# Patient Record
Sex: FEMALE | Race: OTHER | HISPANIC OR LATINO | Employment: UNEMPLOYED | URBAN - METROPOLITAN AREA
[De-identification: names, ages, dates, MRNs, and addresses within clinical notes are randomized per-mention and may not be internally consistent; named-entity substitution may affect disease eponyms.]

---
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
## 2022-01-07 ENCOUNTER — OFFICE VISIT (OUTPATIENT)
Dept: URGENT CARE | Facility: PHYSICIAN GROUP | Age: 22
End: 2022-01-07
Payer: OTHER GOVERNMENT

## 2022-01-07 VITALS
RESPIRATION RATE: 20 BRPM | WEIGHT: 120 LBS | BODY MASS INDEX: 24.19 KG/M2 | SYSTOLIC BLOOD PRESSURE: 92 MMHG | OXYGEN SATURATION: 94 % | HEART RATE: 107 BPM | TEMPERATURE: 97.8 F | DIASTOLIC BLOOD PRESSURE: 58 MMHG | HEIGHT: 59 IN

## 2022-01-07 DIAGNOSIS — J02.9 SORE THROAT: ICD-10-CM

## 2022-01-07 DIAGNOSIS — J98.8 RESPIRATORY TRACT INFECTION DUE TO COVID-19 VIRUS: ICD-10-CM

## 2022-01-07 DIAGNOSIS — U07.1 RESPIRATORY TRACT INFECTION DUE TO COVID-19 VIRUS: ICD-10-CM

## 2022-01-07 LAB
EXTERNAL QUALITY CONTROL: NORMAL
INT CON NEG: NEGATIVE
INT CON POS: POSITIVE
S PYO AG THROAT QL: NEGATIVE
SARS-COV+SARS-COV-2 AG RESP QL IA.RAPID: POSITIVE

## 2022-01-07 PROCEDURE — 99203 OFFICE O/P NEW LOW 30 MIN: CPT | Mod: CS | Performed by: EMERGENCY MEDICINE

## 2022-01-07 PROCEDURE — 87426 SARSCOV CORONAVIRUS AG IA: CPT | Performed by: EMERGENCY MEDICINE

## 2022-01-07 PROCEDURE — 87880 STREP A ASSAY W/OPTIC: CPT | Performed by: EMERGENCY MEDICINE

## 2022-01-07 ASSESSMENT — ENCOUNTER SYMPTOMS
SWOLLEN GLANDS: 1
MYALGIAS: 1
SORE THROAT: 1
DIARRHEA: 1
VOMITING: 1
RHINORRHEA: 1
SHORTNESS OF BREATH: 1
COUGH: 1
NAUSEA: 1
HEADACHES: 1
CHILLS: 1
SPUTUM PRODUCTION: 1

## 2022-01-07 NOTE — PROGRESS NOTES
"Subjective     Audrey Fagan is a 21 y.o. female who presents with Ear Pain (bilateral; 3x day), Cough (4x days), Headache, and Body Aches            URI   This is a new problem. Episode onset: 4 days. Associated symptoms include chest pain, congestion, coughing, diarrhea, ear pain, headaches, joint pain, nausea, rhinorrhea, a sore throat, swollen glands and vomiting.   Unvaccinated for Co-19, no significant PMH.    Review of Systems   Constitutional: Positive for chills and malaise/fatigue.   HENT: Positive for congestion, ear pain, nosebleeds, rhinorrhea and sore throat. Negative for hearing loss.    Respiratory: Positive for cough, sputum production and shortness of breath.    Cardiovascular: Positive for chest pain.   Gastrointestinal: Positive for diarrhea, nausea and vomiting.   Musculoskeletal: Positive for joint pain and myalgias.   Neurological: Positive for headaches.              Objective     BP (!) 92/58 (BP Location: Right arm, Patient Position: Sitting, BP Cuff Size: Adult)   Pulse (!) 107   Temp 36.6 °C (97.8 °F) (Temporal)   Resp 20   Ht 1.499 m (4' 11\")   Wt 54.4 kg (120 lb)   SpO2 94%   BMI 24.24 kg/m²      Physical Exam  Constitutional:       General: She is not in acute distress.     Appearance: She is well-developed. She is not toxic-appearing.   HENT:      Right Ear: Tympanic membrane and ear canal normal.      Left Ear: Tympanic membrane and ear canal normal.      Nose: Mucosal edema and rhinorrhea present.      Mouth/Throat:      Pharynx: Posterior oropharyngeal erythema present. No oropharyngeal exudate.   Eyes:      Conjunctiva/sclera: Conjunctivae normal.   Neck:      Trachea: Trachea normal.   Cardiovascular:      Rate and Rhythm: Regular rhythm. Tachycardia present.  No extrasystoles are present.     Heart sounds: Normal heart sounds. No murmur heard.      Pulmonary:      Effort: Pulmonary effort is normal.      Breath sounds: Normal breath sounds.   Musculoskeletal: "      Cervical back: Neck supple.   Lymphadenopathy:      Cervical: No cervical adenopathy.   Skin:     General: Skin is warm and dry.   Neurological:      Mental Status: She is alert.   Psychiatric:         Behavior: Behavior is cooperative.                         1 minute walk test SaO2 remains greater than 93%.    Assessment & Plan        1. Respiratory tract infection due to COVID-19 virus  Recommended supportive care measures, including rest, increasing oral fluid intake and use of over-the-counter medications for relief of symptoms.  10-day home isolation.  ED if any worsening breathing.  2. Sore throat  negative- POCT Rapid Strep A  positive- POCT SARS-COV Antigen RACHEL (Symptomatic Only)

## 2022-04-16 ENCOUNTER — APPOINTMENT (OUTPATIENT)
Dept: RADIOLOGY | Facility: MEDICAL CENTER | Age: 22
End: 2022-04-16
Attending: EMERGENCY MEDICINE
Payer: COMMERCIAL

## 2022-04-16 ENCOUNTER — HOSPITAL ENCOUNTER (EMERGENCY)
Facility: MEDICAL CENTER | Age: 22
End: 2022-04-16
Attending: EMERGENCY MEDICINE
Payer: COMMERCIAL

## 2022-04-16 VITALS
TEMPERATURE: 97 F | HEART RATE: 90 BPM | OXYGEN SATURATION: 95 % | HEIGHT: 59 IN | RESPIRATION RATE: 14 BRPM | SYSTOLIC BLOOD PRESSURE: 103 MMHG | BODY MASS INDEX: 23.07 KG/M2 | WEIGHT: 114.42 LBS | DIASTOLIC BLOOD PRESSURE: 55 MMHG

## 2022-04-16 DIAGNOSIS — O20.0 THREATENED MISCARRIAGE: ICD-10-CM

## 2022-04-16 DIAGNOSIS — N93.9 VAGINAL BLEEDING: ICD-10-CM

## 2022-04-16 LAB
ALBUMIN SERPL BCP-MCNC: 4.5 G/DL (ref 3.2–4.9)
ALBUMIN/GLOB SERPL: 2 G/DL
ALP SERPL-CCNC: 54 U/L (ref 30–99)
ALT SERPL-CCNC: 11 U/L (ref 2–50)
ANION GAP SERPL CALC-SCNC: 10 MMOL/L (ref 7–16)
APPEARANCE UR: ABNORMAL
AST SERPL-CCNC: 20 U/L (ref 12–45)
B-HCG SERPL-ACNC: 1947 MIU/ML (ref 0–5)
BACTERIA #/AREA URNS HPF: NEGATIVE /HPF
BASOPHILS # BLD AUTO: 0.2 % (ref 0–1.8)
BASOPHILS # BLD: 0.02 K/UL (ref 0–0.12)
BILIRUB SERPL-MCNC: 0.4 MG/DL (ref 0.1–1.5)
BILIRUB UR QL STRIP.AUTO: NEGATIVE
BUN SERPL-MCNC: 7 MG/DL (ref 8–22)
CALCIUM SERPL-MCNC: 9.1 MG/DL (ref 8.5–10.5)
CHLORIDE SERPL-SCNC: 104 MMOL/L (ref 96–112)
CO2 SERPL-SCNC: 23 MMOL/L (ref 20–33)
COLOR UR: ABNORMAL
CREAT SERPL-MCNC: 0.41 MG/DL (ref 0.5–1.4)
EOSINOPHIL # BLD AUTO: 0 K/UL (ref 0–0.51)
EOSINOPHIL NFR BLD: 0 % (ref 0–6.9)
EPI CELLS #/AREA URNS HPF: ABNORMAL /HPF
ERYTHROCYTE [DISTWIDTH] IN BLOOD BY AUTOMATED COUNT: 39.5 FL (ref 35.9–50)
GFR SERPLBLD CREATININE-BSD FMLA CKD-EPI: 144 ML/MIN/1.73 M 2
GLOBULIN SER CALC-MCNC: 2.3 G/DL (ref 1.9–3.5)
GLUCOSE SERPL-MCNC: 139 MG/DL (ref 65–99)
GLUCOSE UR STRIP.AUTO-MCNC: NEGATIVE MG/DL
HCT VFR BLD AUTO: 37.4 % (ref 37–47)
HGB BLD-MCNC: 13.2 G/DL (ref 12–16)
HYALINE CASTS #/AREA URNS LPF: ABNORMAL /LPF
IMM GRANULOCYTES # BLD AUTO: 0.04 K/UL (ref 0–0.11)
IMM GRANULOCYTES NFR BLD AUTO: 0.4 % (ref 0–0.9)
KETONES UR STRIP.AUTO-MCNC: ABNORMAL MG/DL
LEUKOCYTE ESTERASE UR QL STRIP.AUTO: ABNORMAL
LIPASE SERPL-CCNC: 22 U/L (ref 11–82)
LYMPHOCYTES # BLD AUTO: 1.49 K/UL (ref 1–4.8)
LYMPHOCYTES NFR BLD: 15.1 % (ref 22–41)
MCH RBC QN AUTO: 29.9 PG (ref 27–33)
MCHC RBC AUTO-ENTMCNC: 35.3 G/DL (ref 33.6–35)
MCV RBC AUTO: 84.6 FL (ref 81.4–97.8)
MICRO URNS: ABNORMAL
MONOCYTES # BLD AUTO: 0.3 K/UL (ref 0–0.85)
MONOCYTES NFR BLD AUTO: 3 % (ref 0–13.4)
NEUTROPHILS # BLD AUTO: 8.05 K/UL (ref 2–7.15)
NEUTROPHILS NFR BLD: 81.3 % (ref 44–72)
NITRITE UR QL STRIP.AUTO: NEGATIVE
NRBC # BLD AUTO: 0 K/UL
NRBC BLD-RTO: 0 /100 WBC
NUMBER OF RH DOSES IND 8505RD: NORMAL
PH UR STRIP.AUTO: 6 [PH] (ref 5–8)
PLATELET # BLD AUTO: 237 K/UL (ref 164–446)
PMV BLD AUTO: 8.6 FL (ref 9–12.9)
POTASSIUM SERPL-SCNC: 3.8 MMOL/L (ref 3.6–5.5)
PROT SERPL-MCNC: 6.8 G/DL (ref 6–8.2)
PROT UR QL STRIP: 30 MG/DL
RBC # BLD AUTO: 4.42 M/UL (ref 4.2–5.4)
RBC # URNS HPF: >150 /HPF
RBC UR QL AUTO: ABNORMAL
RH BLD: NORMAL
SODIUM SERPL-SCNC: 137 MMOL/L (ref 135–145)
SP GR UR STRIP.AUTO: 1.02
UROBILINOGEN UR STRIP.AUTO-MCNC: 1 MG/DL
WBC # BLD AUTO: 9.9 K/UL (ref 4.8–10.8)
WBC #/AREA URNS HPF: ABNORMAL /HPF

## 2022-04-16 PROCEDURE — 84702 CHORIONIC GONADOTROPIN TEST: CPT

## 2022-04-16 PROCEDURE — 81001 URINALYSIS AUTO W/SCOPE: CPT

## 2022-04-16 PROCEDURE — 86901 BLOOD TYPING SEROLOGIC RH(D): CPT

## 2022-04-16 PROCEDURE — 76817 TRANSVAGINAL US OBSTETRIC: CPT

## 2022-04-16 PROCEDURE — 80053 COMPREHEN METABOLIC PANEL: CPT

## 2022-04-16 PROCEDURE — 99284 EMERGENCY DEPT VISIT MOD MDM: CPT

## 2022-04-16 PROCEDURE — 36415 COLL VENOUS BLD VENIPUNCTURE: CPT

## 2022-04-16 PROCEDURE — 83690 ASSAY OF LIPASE: CPT

## 2022-04-16 PROCEDURE — 85025 COMPLETE CBC W/AUTO DIFF WBC: CPT

## 2022-04-16 PROCEDURE — 99285 EMERGENCY DEPT VISIT HI MDM: CPT

## 2022-04-16 NOTE — ED NOTES
Pt. Ambulatory to yellow pod. Verifies triage note. NIBP and pulse ox in place. VSS. Call light in reach.

## 2022-04-16 NOTE — ED NOTES
Pt. Left ambulatory and in NAD. Verbalized understanding of repeat blood work to be completed, f/u and home monitoring. Denies further questions at time of discharge.

## 2022-04-16 NOTE — ED TRIAGE NOTES
"Chief Complaint   Patient presents with   • Abdominal Pain     Starting this morning   • Vaginal Bleeding     X1 week. Patient had a positive home pregnancy test last month and last night retested and was positive.        Patient to triage ambulatory with a steady gait, AAOx4, Appropriate precautions in place.     Explained wait time and triage process. Placed back in lobby. Told to notify ED tech or RN of any changes, verbalized understanding.    /71   Pulse (!) 108   Temp 36.7 °C (98 °F) (Temporal)   Resp 14   Ht 1.499 m (4' 11\")   Wt 51.9 kg (114 lb 6.7 oz)   LMP 02/21/2022 (Within Weeks)   SpO2 99%   BMI 23.11 kg/m²     "

## 2022-04-16 NOTE — ED NOTES
Pt ambulated to restroom and back to bed. UA sent. NIBP and pulse ox in place. Pt. Denies further needs at this time. Call light in reach.

## 2022-04-16 NOTE — DISCHARGE INSTRUCTIONS
You are likely having a miscarriage.  However you need a recheck in 2 days to confirm this.  If you develop worsening abdominal pain, worsening bleeding or any other concerns return to the ER.  I hope you feel better soon.  You will receive a call from the gynecologist to get your appointment.  You must do your lab before going to see the doctor on Monday.

## 2022-04-18 ENCOUNTER — HOSPITAL ENCOUNTER (OUTPATIENT)
Dept: LAB | Facility: MEDICAL CENTER | Age: 22
End: 2022-04-18
Attending: EMERGENCY MEDICINE
Payer: COMMERCIAL

## 2022-04-18 ENCOUNTER — TELEPHONE (OUTPATIENT)
Dept: OBGYN | Facility: CLINIC | Age: 22
End: 2022-04-18
Payer: COMMERCIAL

## 2022-04-18 DIAGNOSIS — O20.0 THREATENED MISCARRIAGE: ICD-10-CM

## 2022-04-18 LAB — B-HCG SERPL-ACNC: 298 MIU/ML (ref 0–5)

## 2022-04-18 PROCEDURE — 84702 CHORIONIC GONADOTROPIN TEST: CPT

## 2022-04-18 PROCEDURE — 36415 COLL VENOUS BLD VENIPUNCTURE: CPT

## 2022-04-18 NOTE — TELEPHONE ENCOUNTER
Called the pt per request from  to fit in to the schedule next week for a ER f/u. Pt scheduled for an appointment on 04/27/2022. Pt asks if she needs to stay out of work for the week, consulted with  who states that she can go back but would need to be on light duty, no heavy lifting over 10 lbs, and frequent breaks for resting. Pt informed of this, and states that she understands, pt states that she plans on going in for her 2nd HCG lab today, informed the pt that if anything changes because of her lab we will give her a call. Pt also given bleeding precautions, and informed that if her bleeding becomes heavy she needs to go back to the ER. Pt agreeable to plan, and has no further questions.

## 2022-04-19 DIAGNOSIS — N93.9 VAGINAL BLEEDING, ABNORMAL: ICD-10-CM

## 2022-04-20 ENCOUNTER — TELEPHONE (OUTPATIENT)
Dept: OBGYN | Facility: CLINIC | Age: 22
End: 2022-04-20
Payer: COMMERCIAL

## 2022-04-20 NOTE — TELEPHONE ENCOUNTER
Pt notified.  ----- Message from Debo Fox M.D. sent at 4/19/2022  2:12 PM PDT -----  Her HCG dropped to 298 which is pretty consistent with a miscarriage. ( it was 1947 on 4/16). Please call and let her know. If she could get an HCG drawn the day or 2 prior to her appt on 4/27, that would be helpful. That way plan of care would be easier to formulate at that appt. I will ask Dr Pelaez or Yelena to place it (HCG order) in computer.  As you pointed out, please also change her appointment type.  ----- Message -----  From: Bailee Moyer M.D.  Sent: 4/16/2022   3:36 PM PDT  To: Debo Lopez M.D.    Hi Ladies,     Could we please get this patient in on Mon or Tues?  She is having a quant hcg drawn on Mon.  I suspect it is a miscarriage.    Thanks,  Dr. Moyer

## 2022-04-26 ENCOUNTER — HOSPITAL ENCOUNTER (OUTPATIENT)
Dept: LAB | Facility: MEDICAL CENTER | Age: 22
End: 2022-04-26
Attending: OBSTETRICS & GYNECOLOGY
Payer: COMMERCIAL

## 2022-04-26 DIAGNOSIS — N93.9 VAGINAL BLEEDING, ABNORMAL: ICD-10-CM

## 2022-04-26 LAB — B-HCG SERPL-ACNC: 11.1 MIU/ML (ref 0–5)

## 2022-04-26 PROCEDURE — 84702 CHORIONIC GONADOTROPIN TEST: CPT

## 2022-04-26 PROCEDURE — 36415 COLL VENOUS BLD VENIPUNCTURE: CPT

## 2022-04-27 ENCOUNTER — GYNECOLOGY VISIT (OUTPATIENT)
Dept: OBGYN | Facility: CLINIC | Age: 22
End: 2022-04-27
Payer: COMMERCIAL

## 2022-04-27 VITALS — BODY MASS INDEX: 22.42 KG/M2 | WEIGHT: 111 LBS | DIASTOLIC BLOOD PRESSURE: 72 MMHG | SYSTOLIC BLOOD PRESSURE: 118 MMHG

## 2022-04-27 DIAGNOSIS — O03.9 MISCARRIAGE: ICD-10-CM

## 2022-04-27 PROCEDURE — 99213 OFFICE O/P EST LOW 20 MIN: CPT | Performed by: OBSTETRICS & GYNECOLOGY

## 2022-04-27 ASSESSMENT — FIBROSIS 4 INDEX: FIB4 SCORE: 0.51

## 2022-04-27 NOTE — NON-PROVIDER
Pt here for ER follow up- possible miscarriage 04/16  Pt states has been bleeding and now has been having discharge that is brown.   Good# [phone]  Pharmacy verified   Has done 2 HCG counts

## 2022-05-02 ENCOUNTER — TELEPHONE (OUTPATIENT)
Dept: OBGYN | Facility: CLINIC | Age: 22
End: 2022-05-02
Payer: COMMERCIAL

## 2022-05-02 NOTE — TELEPHONE ENCOUNTER
05/02/22 8:24 AM    LM for pt to call back in regards to her HCG levels falling appropriately after miscarriage.

## 2022-05-03 ENCOUNTER — HOSPITAL ENCOUNTER (OUTPATIENT)
Dept: LAB | Facility: MEDICAL CENTER | Age: 22
End: 2022-05-03
Attending: OBSTETRICS & GYNECOLOGY
Payer: COMMERCIAL

## 2022-05-03 DIAGNOSIS — O03.9 MISCARRIAGE: ICD-10-CM

## 2022-05-03 LAB — B-HCG SERPL-ACNC: 1.8 MIU/ML (ref 0–5)

## 2022-05-03 PROCEDURE — 84702 CHORIONIC GONADOTROPIN TEST: CPT

## 2022-05-03 PROCEDURE — 36415 COLL VENOUS BLD VENIPUNCTURE: CPT

## 2022-05-10 ENCOUNTER — TELEPHONE (OUTPATIENT)
Dept: OBGYN | Facility: CLINIC | Age: 22
End: 2022-05-10
Payer: COMMERCIAL

## 2022-05-10 NOTE — TELEPHONE ENCOUNTER
----- Message from Cesar Arboleda M.D. sent at 5/9/2022  3:26 PM PDT -----  Pregnancy hormone levels negative.  Miscarriage resolved      Called pt and notified as above. Pt verbalized understanding.   Pt informed she was scheduled on the wrong office, I apologized and informed pt I will talk to Dr. Arboleda to see if we can fit her in his schedule on Thursday when he is is at our [facility] location when pt needs to be seen. Pt agreed. Pt state she has papers that have to be sign by Dr. Arboleda before she can go back to work. Advised pt to bring them to her appt on Thursday if able to fit her in or drop them off today.      1430 cosulted with Dr. Arboleda and agreed for this RN to fir in pt for Thursday 5/12/2022 at 1030. Called pt and pt scheduled. Pt agreed.

## 2022-05-11 ENCOUNTER — APPOINTMENT (OUTPATIENT)
Dept: OBGYN | Facility: CLINIC | Age: 22
End: 2022-05-11
Payer: COMMERCIAL

## 2022-05-12 ENCOUNTER — GYNECOLOGY VISIT (OUTPATIENT)
Dept: OBGYN | Facility: CLINIC | Age: 22
End: 2022-05-12
Payer: COMMERCIAL

## 2022-05-12 VITALS — DIASTOLIC BLOOD PRESSURE: 60 MMHG | SYSTOLIC BLOOD PRESSURE: 92 MMHG | WEIGHT: 112 LBS | BODY MASS INDEX: 22.62 KG/M2

## 2022-05-12 DIAGNOSIS — O03.9 MISCARRIAGE: ICD-10-CM

## 2022-05-12 PROCEDURE — 99213 OFFICE O/P EST LOW 20 MIN: CPT | Performed by: OBSTETRICS & GYNECOLOGY

## 2022-05-12 RX ORDER — LEVONORGESTREL AND ETHINYL ESTRADIOL 0.1-0.02MG
1 KIT ORAL DAILY
Qty: 28 TABLET | Refills: 12 | Status: SHIPPED | OUTPATIENT
Start: 2022-05-12 | End: 2023-07-31

## 2022-05-12 ASSESSMENT — FIBROSIS 4 INDEX: FIB4 SCORE: 0.51

## 2022-05-12 NOTE — PROGRESS NOTES
Chief Complaint   Patient presents with   • Follow-Up     Lab Result review    Chief complaint: Follow-up miscarriage    History of present illness:   21 y.o.  presents with above chief complaint.  Patient was seen approximately 2 weeks ago.  hCG dropping.  Last level 1.8.  Resolution of miscarriage    No further bleeding since last seen.  Considering some contraception at this time    ROS: Pertinent positives documented in HPI and all other systems reviewed & are negative    POBHx:  1 para 0-0-1-0    PGYNHx: As above, last pap never    All PMH, PSH, meds, allergies, social history and FH reviewed and updated today:  Past Medical History:   Diagnosis Date   • Healthy pediatric patient        No past surgical history on file.    Allergies: No Known Allergies    Social History     Socioeconomic History   • Marital status: Single     Spouse name: Not on file   • Number of children: Not on file   • Years of education: Not on file   • Highest education level: Not on file   Occupational History   • Not on file   Tobacco Use   • Smoking status: Never Smoker   • Smokeless tobacco: Never Used   Vaping Use   • Vaping Use: Never used   Substance and Sexual Activity   • Alcohol use: Yes   • Drug use: No   • Sexual activity: Never   Other Topics Concern   • Behavioral problems Not Asked   • Interpersonal relationships Not Asked   • Sad or not enjoying activities Not Asked   • Suicidal thoughts Not Asked   • Poor school performance Not Asked   • Reading difficulties Not Asked   • Speech difficulties No   • Writing difficulties Not Asked   • Inadequate sleep Not Asked   • Excessive TV viewing Not Asked   • Excessive video game use Not Asked   • Inadequate exercise Not Asked   • Sports related Not Asked   • Poor diet Not Asked   • Family concerns for drug/alcohol abuse Not Asked   • Poor oral hygiene Not Asked   • Bike safety Not Asked   • Family concerns vehicle safety Not Asked   Social History Narrative   • Not on  file     Social Determinants of Health     Financial Resource Strain: Not on file   Food Insecurity: Not on file   Transportation Needs: Not on file   Physical Activity: Not on file   Stress: Not on file   Social Connections: Not on file   Intimate Partner Violence: Not on file   Housing Stability: Not on file       Family History   Problem Relation Age of Onset   • No Known Problems Mother    • No Known Problems Father    • No Known Problems Sister    • No Known Problems Brother        Physical exam:  BP (!) 92/60 (BP Location: Left arm, Patient Position: Sitting, BP Cuff Size: Adult)   Wt 50.8 kg (112 lb)     GENERAL APPEARANCE: healthy, alert, no distress  ABDOMEN Abdomen soft, non-tender. BS normal. No masses,  No organomegaly  EXTREMITIES:negative clubbing, cyanosis, edema    NEURO Awake, alert and oriented x 3, Normal gait, no sensory deficits  SKIN No rashes, or ulcers or lesions seen  PSYCHIATRIC: Patient shows appropriate affect, is alert and oriented x3, intact judgment and insight.      Assessment:  No diagnosis found.    Plan:    hCG levels now negative.  Miscarriage has resolved spontaneously.  No issues identified    Patient interested in contraception.  Discussed birth control pills, ring, patch, Depo-Provera, Nexplanon, IUD with patient.  Pros and cons of the methods were discussed with the patient    Patient is opted to undergo treatment with birth-control pills.  Denies history of DVT/pulmonary embolism, liver disease, migraines with aura or hypertension    Prescription sent.  Follow-up in 2 months

## 2022-05-23 ENCOUNTER — TELEPHONE (OUTPATIENT)
Dept: OBGYN | Facility: CLINIC | Age: 22
End: 2022-05-23
Payer: COMMERCIAL

## 2022-05-23 NOTE — TELEPHONE ENCOUNTER
"Spoke with pt and informed her that I was unable to fill out her disability form due to there not being a medical indication to do so. I informed pt that I would be able to  fill out the \"return to work\" form and have her return to work date be one week after she was seen.Per Gloria HYLTON that is the time given for MC without procedure. Pt stated that she had tried to give paperwork to return to work at her last two visits, but the people she spoke with in regards to the form didn't seem like they wanted to complete the paperwork, so she had been off this whole time due to not getting paperwork completed. I informed the pt that the form was now completed (dropped off to me on 5/19/22) and that she could pick it up at her earliest convenience, pt requested I also fax the form. I agreed, fax confirmation scanned into media. At the end of the conversation pt stated that she no longer needed the disability forms to filled out and that the \"Return to work form\" would be \"good enough\", and that was all she need. Pt had no further questions or concerns.   "

## 2022-07-18 ENCOUNTER — APPOINTMENT (OUTPATIENT)
Dept: RADIOLOGY | Facility: MEDICAL CENTER | Age: 22
End: 2022-07-18
Attending: STUDENT IN AN ORGANIZED HEALTH CARE EDUCATION/TRAINING PROGRAM
Payer: COMMERCIAL

## 2022-07-18 ENCOUNTER — HOSPITAL ENCOUNTER (EMERGENCY)
Facility: MEDICAL CENTER | Age: 22
End: 2022-07-19
Attending: STUDENT IN AN ORGANIZED HEALTH CARE EDUCATION/TRAINING PROGRAM
Payer: COMMERCIAL

## 2022-07-18 ENCOUNTER — OFFICE VISIT (OUTPATIENT)
Dept: URGENT CARE | Facility: CLINIC | Age: 22
End: 2022-07-18
Payer: COMMERCIAL

## 2022-07-18 ENCOUNTER — APPOINTMENT (OUTPATIENT)
Dept: URGENT CARE | Facility: CLINIC | Age: 22
End: 2022-07-18
Payer: COMMERCIAL

## 2022-07-18 VITALS
WEIGHT: 104 LBS | TEMPERATURE: 99.1 F | HEART RATE: 134 BPM | SYSTOLIC BLOOD PRESSURE: 110 MMHG | HEIGHT: 59 IN | BODY MASS INDEX: 20.96 KG/M2 | OXYGEN SATURATION: 91 % | DIASTOLIC BLOOD PRESSURE: 70 MMHG | RESPIRATION RATE: 14 BRPM

## 2022-07-18 DIAGNOSIS — R11.2 NAUSEA AND VOMITING IN ADULT: ICD-10-CM

## 2022-07-18 DIAGNOSIS — R50.9 FEVER IN ADULT: ICD-10-CM

## 2022-07-18 DIAGNOSIS — R39.11 URINARY HESITANCY: ICD-10-CM

## 2022-07-18 DIAGNOSIS — R10.9 FLANK PAIN: ICD-10-CM

## 2022-07-18 DIAGNOSIS — N12 PYELONEPHRITIS: ICD-10-CM

## 2022-07-18 DIAGNOSIS — R00.0 TACHYCARDIA: ICD-10-CM

## 2022-07-18 DIAGNOSIS — E86.0 DEHYDRATION: ICD-10-CM

## 2022-07-18 DIAGNOSIS — Z20.822 CLOSE EXPOSURE TO COVID-19 VIRUS: ICD-10-CM

## 2022-07-18 LAB
ALBUMIN SERPL BCP-MCNC: 4 G/DL (ref 3.2–4.9)
ALBUMIN/GLOB SERPL: 1.2 G/DL
ALP SERPL-CCNC: 75 U/L (ref 30–99)
ALT SERPL-CCNC: 56 U/L (ref 2–50)
ANION GAP SERPL CALC-SCNC: 13 MMOL/L (ref 7–16)
APPEARANCE UR: ABNORMAL
AST SERPL-CCNC: 62 U/L (ref 12–45)
BASOPHILS # BLD AUTO: 0.5 % (ref 0–1.8)
BASOPHILS # BLD: 0.06 K/UL (ref 0–0.12)
BILIRUB SERPL-MCNC: 0.4 MG/DL (ref 0.1–1.5)
BILIRUB UR STRIP-MCNC: NEGATIVE MG/DL
BUN SERPL-MCNC: 9 MG/DL (ref 8–22)
CALCIUM SERPL-MCNC: 8.6 MG/DL (ref 8.5–10.5)
CHLORIDE SERPL-SCNC: 93 MMOL/L (ref 96–112)
CO2 SERPL-SCNC: 24 MMOL/L (ref 20–33)
COLOR UR AUTO: ABNORMAL
CREAT SERPL-MCNC: 0.83 MG/DL (ref 0.5–1.4)
D DIMER PPP IA.FEU-MCNC: 4.66 UG/ML (FEU) (ref 0–0.5)
EOSINOPHIL # BLD AUTO: 0.13 K/UL (ref 0–0.51)
EOSINOPHIL NFR BLD: 1.1 % (ref 0–6.9)
ERYTHROCYTE [DISTWIDTH] IN BLOOD BY AUTOMATED COUNT: 39.7 FL (ref 35.9–50)
FLUAV RNA SPEC QL NAA+PROBE: NEGATIVE
FLUBV RNA SPEC QL NAA+PROBE: NEGATIVE
GFR SERPLBLD CREATININE-BSD FMLA CKD-EPI: 103 ML/MIN/1.73 M 2
GLOBULIN SER CALC-MCNC: 3.3 G/DL (ref 1.9–3.5)
GLUCOSE SERPL-MCNC: 120 MG/DL (ref 65–99)
GLUCOSE UR STRIP.AUTO-MCNC: NEGATIVE MG/DL
HCG SERPL QL: NEGATIVE
HCT VFR BLD AUTO: 38.9 % (ref 37–47)
HGB BLD-MCNC: 13 G/DL (ref 12–16)
IMM GRANULOCYTES # BLD AUTO: 0.07 K/UL (ref 0–0.11)
IMM GRANULOCYTES NFR BLD AUTO: 0.6 % (ref 0–0.9)
KETONES UR STRIP.AUTO-MCNC: 80 MG/DL
LACTATE SERPL-SCNC: 1.1 MMOL/L (ref 0.5–2)
LEUKOCYTE ESTERASE UR QL STRIP.AUTO: ABNORMAL
LIPASE SERPL-CCNC: 20 U/L (ref 11–82)
LYMPHOCYTES # BLD AUTO: 1.41 K/UL (ref 1–4.8)
LYMPHOCYTES NFR BLD: 11.5 % (ref 22–41)
MCH RBC QN AUTO: 28.1 PG (ref 27–33)
MCHC RBC AUTO-ENTMCNC: 33.4 G/DL (ref 33.6–35)
MCV RBC AUTO: 84.2 FL (ref 81.4–97.8)
MONOCYTES # BLD AUTO: 1.54 K/UL (ref 0–0.85)
MONOCYTES NFR BLD AUTO: 12.6 % (ref 0–13.4)
NEUTROPHILS # BLD AUTO: 9 K/UL (ref 2–7.15)
NEUTROPHILS NFR BLD: 73.7 % (ref 44–72)
NITRITE UR QL STRIP.AUTO: POSITIVE
NRBC # BLD AUTO: 0 K/UL
NRBC BLD-RTO: 0 /100 WBC
PH UR STRIP.AUTO: 6 [PH] (ref 5–8)
PLATELET # BLD AUTO: 259 K/UL (ref 164–446)
PMV BLD AUTO: 8.8 FL (ref 9–12.9)
POTASSIUM SERPL-SCNC: 3.4 MMOL/L (ref 3.6–5.5)
PROT SERPL-MCNC: 7.3 G/DL (ref 6–8.2)
PROT UR QL STRIP: 100 MG/DL
RBC # BLD AUTO: 4.62 M/UL (ref 4.2–5.4)
RBC UR QL AUTO: ABNORMAL
RSV RNA SPEC QL NAA+PROBE: NEGATIVE
SARS-COV-2 RNA RESP QL NAA+PROBE: NOTDETECTED
SODIUM SERPL-SCNC: 130 MMOL/L (ref 135–145)
SP GR UR STRIP.AUTO: 1.01
SPECIMEN SOURCE: NORMAL
UROBILINOGEN UR STRIP-MCNC: 4 MG/DL
WBC # BLD AUTO: 12.2 K/UL (ref 4.8–10.8)

## 2022-07-18 PROCEDURE — 85379 FIBRIN DEGRADATION QUANT: CPT

## 2022-07-18 PROCEDURE — C9803 HOPD COVID-19 SPEC COLLECT: HCPCS

## 2022-07-18 PROCEDURE — 0241U HCHG SARS-COV-2 COVID-19 NFCT DS RESP RNA 4 TRGT MIC: CPT

## 2022-07-18 PROCEDURE — 85025 COMPLETE CBC W/AUTO DIFF WBC: CPT

## 2022-07-18 PROCEDURE — 83605 ASSAY OF LACTIC ACID: CPT

## 2022-07-18 PROCEDURE — 700102 HCHG RX REV CODE 250 W/ 637 OVERRIDE(OP): Performed by: STUDENT IN AN ORGANIZED HEALTH CARE EDUCATION/TRAINING PROGRAM

## 2022-07-18 PROCEDURE — 700105 HCHG RX REV CODE 258: Performed by: STUDENT IN AN ORGANIZED HEALTH CARE EDUCATION/TRAINING PROGRAM

## 2022-07-18 PROCEDURE — 74177 CT ABD & PELVIS W/CONTRAST: CPT

## 2022-07-18 PROCEDURE — 83690 ASSAY OF LIPASE: CPT

## 2022-07-18 PROCEDURE — 84703 CHORIONIC GONADOTROPIN ASSAY: CPT

## 2022-07-18 PROCEDURE — 87077 CULTURE AEROBIC IDENTIFY: CPT

## 2022-07-18 PROCEDURE — 87186 SC STD MICRODIL/AGAR DIL: CPT

## 2022-07-18 PROCEDURE — A9270 NON-COVERED ITEM OR SERVICE: HCPCS | Performed by: STUDENT IN AN ORGANIZED HEALTH CARE EDUCATION/TRAINING PROGRAM

## 2022-07-18 PROCEDURE — 87040 BLOOD CULTURE FOR BACTERIA: CPT

## 2022-07-18 PROCEDURE — C9803 HOPD COVID-19 SPEC COLLECT: HCPCS | Mod: EDC | Performed by: STUDENT IN AN ORGANIZED HEALTH CARE EDUCATION/TRAINING PROGRAM

## 2022-07-18 PROCEDURE — 99214 OFFICE O/P EST MOD 30 MIN: CPT | Performed by: NURSE PRACTITIONER

## 2022-07-18 PROCEDURE — 81002 URINALYSIS NONAUTO W/O SCOPE: CPT | Performed by: NURSE PRACTITIONER

## 2022-07-18 PROCEDURE — 87086 URINE CULTURE/COLONY COUNT: CPT

## 2022-07-18 PROCEDURE — 99285 EMERGENCY DEPT VISIT HI MDM: CPT | Mod: EDC

## 2022-07-18 PROCEDURE — 36415 COLL VENOUS BLD VENIPUNCTURE: CPT | Mod: EDC

## 2022-07-18 PROCEDURE — 71045 X-RAY EXAM CHEST 1 VIEW: CPT

## 2022-07-18 PROCEDURE — 80053 COMPREHEN METABOLIC PANEL: CPT

## 2022-07-18 PROCEDURE — 81001 URINALYSIS AUTO W/SCOPE: CPT

## 2022-07-18 PROCEDURE — 700117 HCHG RX CONTRAST REV CODE 255: Performed by: STUDENT IN AN ORGANIZED HEALTH CARE EDUCATION/TRAINING PROGRAM

## 2022-07-18 RX ORDER — ACETAMINOPHEN 325 MG/1
650 TABLET ORAL ONCE
Status: COMPLETED | OUTPATIENT
Start: 2022-07-18 | End: 2022-07-18

## 2022-07-18 RX ORDER — SODIUM CHLORIDE 9 MG/ML
1000 INJECTION, SOLUTION INTRAVENOUS ONCE
Status: COMPLETED | OUTPATIENT
Start: 2022-07-18 | End: 2022-07-19

## 2022-07-18 RX ORDER — SODIUM CHLORIDE 9 MG/ML
1000 INJECTION, SOLUTION INTRAVENOUS ONCE
Status: COMPLETED | OUTPATIENT
Start: 2022-07-18 | End: 2022-07-18

## 2022-07-18 RX ADMIN — SODIUM CHLORIDE 1000 ML: 9 INJECTION, SOLUTION INTRAVENOUS at 23:18

## 2022-07-18 RX ADMIN — IOHEXOL 75 ML: 350 INJECTION, SOLUTION INTRAVENOUS at 23:10

## 2022-07-18 RX ADMIN — ACETAMINOPHEN 650 MG: 325 TABLET, FILM COATED ORAL at 22:02

## 2022-07-18 RX ADMIN — SODIUM CHLORIDE 1000 ML: 9 INJECTION, SOLUTION INTRAVENOUS at 22:02

## 2022-07-18 ASSESSMENT — LIFESTYLE VARIABLES
EVER FELT BAD OR GUILTY ABOUT YOUR DRINKING: NO
CONSUMPTION TOTAL: INCOMPLETE
DO YOU DRINK ALCOHOL: NO
TOTAL SCORE: 0
TOTAL SCORE: 0
EVER HAD A DRINK FIRST THING IN THE MORNING TO STEADY YOUR NERVES TO GET RID OF A HANGOVER: NO
DOES PATIENT WANT TO STOP DRINKING: NO
TOTAL SCORE: 0
HAVE YOU EVER FELT YOU SHOULD CUT DOWN ON YOUR DRINKING: NO
HAVE PEOPLE ANNOYED YOU BY CRITICIZING YOUR DRINKING: NO

## 2022-07-18 ASSESSMENT — FIBROSIS 4 INDEX
FIB4 SCORE: 0.51
FIB4 SCORE: 0.51

## 2022-07-19 VITALS
OXYGEN SATURATION: 97 % | DIASTOLIC BLOOD PRESSURE: 58 MMHG | TEMPERATURE: 98.2 F | HEART RATE: 94 BPM | RESPIRATION RATE: 19 BRPM | SYSTOLIC BLOOD PRESSURE: 103 MMHG | BODY MASS INDEX: 21.02 KG/M2 | WEIGHT: 104.28 LBS | HEIGHT: 59 IN

## 2022-07-19 LAB
APPEARANCE UR: ABNORMAL
BACTERIA #/AREA URNS HPF: ABNORMAL /HPF
BILIRUB UR QL STRIP.AUTO: NEGATIVE
COLOR UR: YELLOW
EPI CELLS #/AREA URNS HPF: ABNORMAL /HPF
GLUCOSE UR STRIP.AUTO-MCNC: NEGATIVE MG/DL
KETONES UR STRIP.AUTO-MCNC: ABNORMAL MG/DL
LEUKOCYTE ESTERASE UR QL STRIP.AUTO: ABNORMAL
MICRO URNS: ABNORMAL
NITRITE UR QL STRIP.AUTO: POSITIVE
PH UR STRIP.AUTO: 6 [PH] (ref 5–8)
PROT UR QL STRIP: 30 MG/DL
RBC # URNS HPF: ABNORMAL /HPF
RBC UR QL AUTO: ABNORMAL
SP GR UR STRIP.AUTO: >=1.045
UROBILINOGEN UR STRIP.AUTO-MCNC: 1 MG/DL
WBC #/AREA URNS HPF: ABNORMAL /HPF

## 2022-07-19 PROCEDURE — 71275 CT ANGIOGRAPHY CHEST: CPT

## 2022-07-19 PROCEDURE — 96374 THER/PROPH/DIAG INJ IV PUSH: CPT | Mod: EDC,XU

## 2022-07-19 PROCEDURE — 700117 HCHG RX CONTRAST REV CODE 255: Performed by: STUDENT IN AN ORGANIZED HEALTH CARE EDUCATION/TRAINING PROGRAM

## 2022-07-19 PROCEDURE — 700111 HCHG RX REV CODE 636 W/ 250 OVERRIDE (IP): Performed by: STUDENT IN AN ORGANIZED HEALTH CARE EDUCATION/TRAINING PROGRAM

## 2022-07-19 RX ORDER — CEFTRIAXONE 2 G/1
2 INJECTION, POWDER, FOR SOLUTION INTRAMUSCULAR; INTRAVENOUS ONCE
Status: COMPLETED | OUTPATIENT
Start: 2022-07-19 | End: 2022-07-19

## 2022-07-19 RX ORDER — CEFDINIR 300 MG/1
300 CAPSULE ORAL 2 TIMES DAILY
Qty: 14 CAPSULE | Refills: 0 | Status: SHIPPED | OUTPATIENT
Start: 2022-07-19 | End: 2022-07-26

## 2022-07-19 RX ADMIN — CEFTRIAXONE SODIUM 2 G: 2 INJECTION, POWDER, FOR SOLUTION INTRAMUSCULAR; INTRAVENOUS at 00:37

## 2022-07-19 RX ADMIN — IOHEXOL 50 ML: 350 INJECTION, SOLUTION INTRAVENOUS at 00:05

## 2022-07-19 NOTE — ED NOTES
Patient taken to CT by CT tech staff.  Patient leaves the department awake, alert, in no apparent distress.

## 2022-07-19 NOTE — ED NOTES
Patient returned from CT with CT tech staff.  Patient arrives in the department awake, alert and in no apparent distress.  Additional fluids established and VS reassessed.  Patient denies the urge to void still.  No other needs or concerns at this time.

## 2022-07-19 NOTE — ED PROVIDER NOTES
"ED Provider Note    CHIEF COMPLAINT  Chief Complaint   Patient presents with   • Coronavirus Screening   • Sent from Urgent Care   • Flank Pain   • Abdominal Pain       HPI  Audrey Fagan is a 21 y.o. female who presents with complaints of several weeks of congestion, cough, headaches, neck stiffness, abdominal pain, \"uterus\" pain, myalgias.  Patient reports has had fevers but states the highest fever she has had at home is 98.  She reports generalized weakness.  She states her neck stiffness has improved and is now only present sometimes at night.  She had her immunizations as a child.  She states the main reason she came here today was because she is having trouble going back to work due to persistent symptoms.  She states she has been going to work and is able to ambulate without difficulty around at work but is just extremely fatigued and people keep sending her home because she appears ill.  She denies any dysuria.  Denies unusual vaginal bleeding or discharge.  She is here with her boyfriend.  She stopped taking her birth control pills about 1 week ago to see if this would help with the symptoms.  She states she has not been sexually active since that time.  She denies sore throat.  Patient also reports low back pain that has been ongoing since the symptoms started.  She states that has felt worse in the last week.  She denies trauma.  Denies history of IV drug use.  Denies bowel or bladder incontinence.     REVIEW OF SYSTEMS  See HPI for further details. All other systems are negative.     PAST MEDICAL HISTORY   has a past medical history of Healthy pediatric patient.    SOCIAL HISTORY  Social History     Tobacco Use   • Smoking status: Never Smoker   • Smokeless tobacco: Never Used   Vaping Use   • Vaping Use: Never used   Substance and Sexual Activity   • Alcohol use: Yes   • Drug use: No   • Sexual activity: Never       SURGICAL HISTORY  patient denies any surgical history    CURRENT " "MEDICATIONS  Home Medications     Reviewed by Claudine Samaniego R.N. (Registered Nurse) on 07/18/22 at 2105  Med List Status: Not Addressed   Medication Last Dose Status   levonorgestrel-ethinyl estradiol (AVIANE) 0.1-20 MG-MCG per tablet  Active                ALLERGIES  No Known Allergies    PHYSICAL EXAM  VITAL SIGNS: /58   Pulse 94   Temp 36.8 °C (98.2 °F) (Temporal)   Resp 19   Ht 1.499 m (4' 11\")   Wt 47.3 kg (104 lb 4.4 oz)   SpO2 97%   BMI 21.06 kg/m²     Pulse ox interpretation: I interpret this pulse ox as normal.  Constitutional: Alert in no apparent distress.  Pale  HENT: No signs of trauma, Bilateral external ears normal, Nose normal.  Oropharynx is clear, no unilateral swelling or exudates  Eyes: Pupils are equal and reactive, Conjunctiva normal, Non-icteric.   Neck: Normal range of motion, No tenderness, Supple, No stridor.  No meningismus  Lymphatic: No cervical lymphadenopathy noted.   Cardiovascular: Tachycardic, regular rhythm, no murmurs.   Thorax & Lungs: Normal breath sounds, No respiratory distress, No wheezing  Abdomen: Soft, No tenderness, No masses, No pulsatile masses. No peritoneal signs.  Skin: Warm, Dry, No erythema, No rash.   Back: No midline bony tenderness, No CVA tenderness.   Extremities: Intact distal pulses, No edema, No tenderness, No cyanosis.  Musculoskeletal: Good range of motion in all major joints. No major deformities noted.   Neurologic: Alert , Normal motor function, No focal deficits noted.   Psychiatric: Affect normal, Judgment normal, Mood normal.       DIAGNOSTIC STUDIES / PROCEDURES    LABS  Results for orders placed or performed during the hospital encounter of 07/18/22   CBC WITH DIFFERENTIAL   Result Value Ref Range    WBC 12.2 (H) 4.8 - 10.8 K/uL    RBC 4.62 4.20 - 5.40 M/uL    Hemoglobin 13.0 12.0 - 16.0 g/dL    Hematocrit 38.9 37.0 - 47.0 %    MCV 84.2 81.4 - 97.8 fL    MCH 28.1 27.0 - 33.0 pg    MCHC 33.4 (L) 33.6 - 35.0 g/dL    RDW 39.7 35.9 - " 50.0 fL    Platelet Count 259 164 - 446 K/uL    MPV 8.8 (L) 9.0 - 12.9 fL    Neutrophils-Polys 73.70 (H) 44.00 - 72.00 %    Lymphocytes 11.50 (L) 22.00 - 41.00 %    Monocytes 12.60 0.00 - 13.40 %    Eosinophils 1.10 0.00 - 6.90 %    Basophils 0.50 0.00 - 1.80 %    Immature Granulocytes 0.60 0.00 - 0.90 %    Nucleated RBC 0.00 /100 WBC    Neutrophils (Absolute) 9.00 (H) 2.00 - 7.15 K/uL    Lymphs (Absolute) 1.41 1.00 - 4.80 K/uL    Monos (Absolute) 1.54 (H) 0.00 - 0.85 K/uL    Eos (Absolute) 0.13 0.00 - 0.51 K/uL    Baso (Absolute) 0.06 0.00 - 0.12 K/uL    Immature Granulocytes (abs) 0.07 0.00 - 0.11 K/uL    NRBC (Absolute) 0.00 K/uL   COMP METABOLIC PANEL   Result Value Ref Range    Sodium 130 (L) 135 - 145 mmol/L    Potassium 3.4 (L) 3.6 - 5.5 mmol/L    Chloride 93 (L) 96 - 112 mmol/L    Co2 24 20 - 33 mmol/L    Anion Gap 13.0 7.0 - 16.0    Glucose 120 (H) 65 - 99 mg/dL    Bun 9 8 - 22 mg/dL    Creatinine 0.83 0.50 - 1.40 mg/dL    Calcium 8.6 8.5 - 10.5 mg/dL    AST(SGOT) 62 (H) 12 - 45 U/L    ALT(SGPT) 56 (H) 2 - 50 U/L    Alkaline Phosphatase 75 30 - 99 U/L    Total Bilirubin 0.4 0.1 - 1.5 mg/dL    Albumin 4.0 3.2 - 4.9 g/dL    Total Protein 7.3 6.0 - 8.2 g/dL    Globulin 3.3 1.9 - 3.5 g/dL    A-G Ratio 1.2 g/dL   LIPASE   Result Value Ref Range    Lipase 20 11 - 82 U/L   HCG QUAL SERUM   Result Value Ref Range    Beta-Hcg Qualitative Serum Negative Negative   URINALYSIS,CULTURE IF INDICATED    Specimen: Urine   Result Value Ref Range    Color Yellow     Character Cloudy (A)     Specific Gravity >=1.045 (A) <1.035    Ph 6.0 5.0 - 8.0    Glucose Negative Negative mg/dL    Ketones Trace (A) Negative mg/dL    Protein 30 (A) Negative mg/dL    Bilirubin Negative Negative    Urobilinogen, Urine 1.0 Negative    Nitrite Positive (A) Negative    Leukocyte Esterase Moderate (A) Negative    Occult Blood Small (A) Negative    Micro Urine Req Microscopic    CoV-2, FLU A/B, and RSV by PCR (2-4 Hours HEXIOHEID) : Collect NP swab  in VTM    Specimen: Nasopharyngeal; Respirate   Result Value Ref Range    Influenza virus A RNA Negative Negative    Influenza virus B, PCR Negative Negative    RSV, PCR Negative Negative    SARS-CoV-2 by PCR NotDetected     SARS-CoV-2 Source NP Swab    D-DIMER   Result Value Ref Range    D-Dimer Screen 4.66 (H) 0.00 - 0.50 ug/mL (FEU)   LACTIC ACID   Result Value Ref Range    Lactic Acid 1.1 0.5 - 2.0 mmol/L   ESTIMATED GFR   Result Value Ref Range    GFR (CKD-EPI) 103 >60 mL/min/1.73 m 2   URINE MICROSCOPIC (W/UA)   Result Value Ref Range    WBC 10-20 (A) /hpf    RBC 2-5 (A) /hpf    Bacteria Moderate (A) None /hpf    Epithelial Cells Few /hpf   URINE CULTURE(NEW)    Specimen: Urine   Result Value Ref Range    Significant Indicator NEG     Source UR     Site -     Culture Result -        RADIOLOGY  CT-CTA CHEST PULMONARY ARTERY W/ RECONS   Final Result         1.  No pulmonary embolus appreciated.   2.  Striated enhancement of the bilateral kidneys, appearance suggests changes of pyelonephritis, correlate with urinalysis      CT-ABDOMEN-PELVIS WITH   Final Result         1.  Slight striated enhancement of the bilateral kidneys, could correspond with changes of pyelonephritis, correlate with urinalysis.   2.  Low-density lesion in the anterior right hepatic lobe, could represent small cyst otherwise indeterminate.   3.  Hepatomegaly      DX-CHEST-PORTABLE (1 VIEW)   Final Result         1.  No acute cardiopulmonary disease.              COURSE & MEDICAL DECISION MAKING  Pertinent Labs & Imaging studies reviewed. (See chart for details)      21-year-old female presented with a multitude of complaints over the last several weeks.  Her vital signs remarkable for tachycardia and low-grade fever here although she was normotensive and well-perfused.  Given her symptoms however she did meet septic criteria.  Her lactic was normal.  She had a mild leukocytosis.  Given her combination of respiratory symptoms and her  tachycardia she did not meet PERC criteria, so D-dimer was done which returned elevated.  CT of the abdomen pelvis showed only stranding in the kidneys and UA was consistent with infection.  CTA showed no evidence of PE.  No pneumonia.  Her renal function was normal, and electrolytes were normal.  Her LFTs were for a slightly elevated, but COVID was negative as was flu and RSV.  Certainly possible patient could have had COVID a week and a half ago and her PCR test has now reverted to negative and she still has mildly persistent elevation in her LFTs.  There is no evidence on imaging or tenderness in the right upper quadrant on exam to suspect cholecystitis.  She was given dose of ceftriaxone here, discharged home on Omnicef with return precautions.    @HYDRATION: Based on the patient's presentation of Sepsis and Tachycardia the patient was given IV fluids. IV Hydration was used because oral hydration was not adequate alone. Upon recheck following hydration, the patient was improved.@      The patient will not drink alcohol nor drive with prescribed medications. The patient will return for worsening symptoms and is stable at the time of discharge. The patient verbalizes understanding and will comply.    FINAL IMPRESSION  1. Pyelonephritis  cefdinir (OMNICEF) 300 MG Cap         Electronically signed by: Ivanna Davila M.D., 7/18/2022 9:42 PM

## 2022-07-19 NOTE — ED NOTES
Assumed care of patient at this time.  Patient states that she went to UC today with covid symptoms and flank pain and sent here for further evaluation.  Patientt denies fevers, diarrhea, and recent trauma.  Patient states nausea currently.  Upon assessment to patient, they are alert, pink, interactive, and in NAD.  Denies additional needs or concerns at this time.  Chart up for ERP eval.

## 2022-07-19 NOTE — ED TRIAGE NOTES
"Chief Complaint   Patient presents with   • Coronavirus Screening   • Sent from Urgent Care   • Flank Pain   • Abdominal Pain     Pt seen at Urgent Care for flak pain and lower abdominal pain. She was transferred her for reported low spo2 and high heart rate. Spo2 96% in triage room. Pt is very vague with answers. States she has covid but it appears she was not tested at .   Pain 8/10.     /69   Pulse (!) 127   Temp 36.1 °C (96.9 °F) (Temporal)   Resp 16   Ht 1.499 m (4' 11\")   Wt 47.3 kg (104 lb 4.4 oz)   SpO2 96% Comment: RA  BMI 21.06 kg/m²     "

## 2022-07-19 NOTE — ED NOTES
"[facility]    Discharge instructions, which include signs and symptoms to monitor patient for, as well as detailed information regarding pyelonephritis provided.  All questions and concerns addressed at this time.  Patient provided education on when to return to the ER included, but not limited to, uncontrolled fevers when medicating with motrin and tylenol, persistent vomiting, signs and symptoms of dehydration, and difficulty breathing.  Patient verbally understands with no concerns.  Patient advised on setting up MyChart.  Follow up visit with PCP encouraged.  Immanuel's office contact information with phone number and address provided.  Prescription for OMNICEF provided to patient. Patient advised that they will need to finish the entire course of antibiotics regardless if symptoms improve.  Patient advised to stop taking medications if signs and symptoms of allergic reaction occur and advised that medications can take approx 48 hours to take effect.  Patient advised that OMNICEF could potentially change the color of patient's stool to red which can look like blood.  Patient advised that this is normal for the antibiotic and the way it worked.  Patient advised to add probiotic to diet in case patient has diarrhea from antibiotic use.  Patient verbalizes understanding.    Patient leaves ER in no apparent distress. This RN provided education regarding returning to the ER for any new concerns or changes in patient's condition.      /58   Pulse 94   Temp 36.8 °C (98.2 °F) (Temporal)   Resp 19   Ht 1.499 m (4' 11\")   Wt 47.3 kg (104 lb 4.4 oz)   SpO2 97%   BMI 21.06 kg/m²   "

## 2022-07-19 NOTE — ED NOTES
22G IV established to patient's LAC.  Patient tolerated well with boyfriend at bedside.  Blood collected and sent to lab.  Patient medicated per MAR and fluids established.  Supplies and instructions provided for clean catch urine sample.  Patient updated on approximate wait times for results.  Patient with no other concerns or questions at this time.

## 2022-07-19 NOTE — PROGRESS NOTES
"Subjective:   Audrey Fagan is a 21 y.o. female who presents for Coronavirus Screening (Cough, Fever, vomiting, fatigue, low fever, hard to walk. Stiff neck and hot. SOB. Body aches. Dark urine.  x 2 1/2  weeks)       HPI  Patient presents for evaluation of 2 and half week history of cough, low-grade fever, fatigue, headache, neck pain, pain with walking, and myalgia.  Over the past 2 to 3 days, patient has had dark urine, with persistent nausea and vomiting, inability to keep down any food and drink.  Patient has tried rest, change her diet, diet and Advil with minimal relief of her symptoms.  Denies any specific known ill contacts measures, however states that may have been exposed to COVID at her workplace.    ROS  All other systems are negative except as documented above within HPI.    MEDS:   Current Outpatient Medications:   •  levonorgestrel-ethinyl estradiol (AVIANE) 0.1-20 MG-MCG per tablet, Take 1 Tablet by mouth every day. (Patient not taking: Reported on 7/18/2022), Disp: 28 Tablet, Rfl: 12  ALLERGIES: No Known Allergies    Patient's PMH, SocHx, SurgHx, FamHx, Drug allergies and medications were reviewed.     Objective:   /70   Pulse (!) 134   Temp 37.3 °C (99.1 °F)   Resp 14   Ht 1.499 m (4' 11\")   Wt 47.2 kg (104 lb)   SpO2 91%   BMI 21.01 kg/m²     Physical Exam  Vitals and nursing note reviewed.   Constitutional:       General: She is awake.      Appearance: Normal appearance. She is well-developed and normal weight.      Comments: tearful   HENT:      Head: Normocephalic and atraumatic.      Right Ear: Tympanic membrane, ear canal and external ear normal.      Left Ear: Tympanic membrane, ear canal and external ear normal.      Nose: Nose normal.      Mouth/Throat:      Lips: Pink.      Mouth: Mucous membranes are moist.      Pharynx: Oropharynx is clear. Uvula midline.   Eyes:      Extraocular Movements: Extraocular movements intact.      Conjunctiva/sclera: " Conjunctivae normal.      Pupils: Pupils are equal, round, and reactive to light.   Neck:      Thyroid: No thyromegaly.      Trachea: Trachea normal.   Cardiovascular:      Rate and Rhythm: Normal rate and regular rhythm.      Pulses: Normal pulses.      Heart sounds: Normal heart sounds, S1 normal and S2 normal.   Pulmonary:      Effort: Pulmonary effort is normal. No respiratory distress.      Breath sounds: Normal breath sounds. No wheezing, rhonchi or rales.   Abdominal:      General: Bowel sounds are normal.      Palpations: Abdomen is soft.      Tenderness: There is right CVA tenderness and left CVA tenderness.   Musculoskeletal:         General: Normal range of motion.      Cervical back: Full passive range of motion without pain, normal range of motion and neck supple.   Lymphadenopathy:      Cervical: No cervical adenopathy.   Skin:     General: Skin is warm and dry.      Capillary Refill: Capillary refill takes less than 2 seconds.   Neurological:      General: No focal deficit present.      Mental Status: She is alert and oriented to person, place, and time.      Gait: Gait is intact.   Psychiatric:         Attention and Perception: Attention and perception normal.         Mood and Affect: Mood normal.         Speech: Speech normal.         Behavior: Behavior normal. Behavior is cooperative.         Thought Content: Thought content normal.         Judgment: Judgment normal.         Assessment/Plan:   Assessment    1. Pyelonephritis    2. Dehydration    3. Close exposure to COVID-19 virus    4. Fever in adult  - POCT Urinalysis    5. Tachycardia  - POCT Urinalysis    6. Nausea and vomiting in adult  - POCT Urinalysis    7. Flank pain  - POCT Urinalysis    8. Urinary hesitancy  - POCT Urinalysis      Vital signs stable at today's acute urgent care visit.  Her UA, vitals and +CVA tenderness is concerning for acute pyelonephritis and possible sepsis.  Therefore, she is advised to go to the ED for further  evaluation and higher level of care. All questions were encouraged and answered to the patient's satisfaction and understanding, and they agree to the plan of care.     I personally reviewed prior external notes and test results pertinent to today and independently reviewed and interpreted all diagnostics ordered during this urgent care acute visit. Time spent evaluating this patient includes preparing for visit, counseling/education, exam, evaluation, obtaining history, and ordering lab/test/procedures.      Please note that this dictation was created using voice recognition software. I have made a reasonable attempt to correct obvious errors, but I expect that there are errors of grammar and possibly content that I did not discover before finalizing the note.

## 2022-07-21 LAB
BACTERIA UR CULT: ABNORMAL
BACTERIA UR CULT: ABNORMAL
SIGNIFICANT IND 70042: ABNORMAL
SITE SITE: ABNORMAL
SOURCE SOURCE: ABNORMAL

## 2022-07-21 NOTE — PROGRESS NOTES
ED Positive Culture Follow-up/Notification Note:    Date: 7/21/22     Patient seen in the ED on 7/18/2022 for:   1. Pyelonephritis       Discharge Medication List as of 7/19/2022 12:45 AM      START taking these medications    Details   cefdinir (OMNICEF) 300 MG Cap Take 1 Capsule by mouth 2 times a day for 7 days., Disp-14 Capsule, R-0, Normal             Allergies: Patient has no known allergies.     Vitals:    07/18/22 2251 07/18/22 2321 07/19/22 0014 07/19/22 0041   BP: 110/64 112/55 106/64 103/58   Pulse: 99 (!) 103 91 94   Resp:  14 18 19   Temp:  37.3 °C (99.2 °F) 37.1 °C (98.7 °F) 36.8 °C (98.2 °F)   TempSrc:  Temporal Temporal Temporal   SpO2: 96% 95% 97% 97%   Weight:       Height:           Final cultures:   Results     Procedure Component Value Units Date/Time    URINE CULTURE(NEW) [[id number]]  (Abnormal)  (Susceptibility) Collected: 07/18/22 2335    Order Status: Completed Specimen: Urine Updated: 07/21/22 0849     Significant Indicator POS     Source UR     Site -     Culture Result -      Escherichia coli  >100,000 cfu/mL  Presumptive identification      Narrative:      Indication for culture:->Patient WITHOUT an indwelling Wu  catheter in place with new onset of Dysuria, Frequency,  Urgency, and/or Suprapubic pain    Susceptibility     Escherichia coli (1)     Antibiotic Interpretation Microscan   Method Status    Amikacin  [*]  Sensitive <=16 mcg/mL MIKEY Final    Ampicillin Sensitive <=8 mcg/mL MIKEY Final    Amoxicillin/CA  [*]  Sensitive <=8/4 mcg/mL MIKEY Final    Aztreonam  [*]  Sensitive <=4 mcg/mL MIKEY Final    Ceftolozane+Tazobactam  [*]  Sensitive <=2 mcg/mL MIKEY Final    Ceftriaxone Sensitive <=1 mcg/mL MIKEY Final    Ceftazidime  [*]  Sensitive <=1 mcg/mL MIKEY Final    Cefazolin Sensitive <=2 mcg/mL MIKEY Final     Breakpoints when Cefazolin is used for therapy of infections  other than uncomplicated UTIs due to Enterobacterales are as  follows:  MIKEY and Interpretation:  <=2 S  4 I  >=8 R          "Ciprofloxacin Sensitive <=0.25 mcg/mL MIKEY Final    Cefepime Sensitive <=2 mcg/mL MIKEY Final    Cefuroxime Sensitive <=4 mcg/mL MIKEY Final    Ceftazidime+Avibactam  [*]  Sensitive <=4 mcg/mL MIKEY Final    Ampicillin/sulbactam Sensitive <=4/2 mcg/mL MIKEY Final    Ertapenem  [*]  Sensitive <=0.5 mcg/mL MIKEY Final    Tobramycin Sensitive <=2 mcg/mL MIKEY Final    Nitrofurantoin Sensitive <=32 mcg/mL MIKEY Final    Gentamicin Sensitive <=2 mcg/mL MIKEY Final    Imipenem  [*]  Sensitive <=1 mcg/mL MIKEY Final    Levofloxacin Sensitive <=0.5 mcg/mL MIKEY Final    Meropenem  [*]  Sensitive <=1 mcg/mL MKIEY Final    Meropenem/Vaborbactam  [*]  Sensitive <=2 mcg/mL MIKEY Final    Minocycline Sensitive <=4 mcg/mL MIKEY Final    Pip/Tazobactam Sensitive <=8 mcg/mL MIKEY Final    Trimeth/Sulfa Sensitive <=0.5/9.5 mcg/mL MIKEY Final    Tetracycline  [*]  Sensitive <=4 mcg/mL MIKEY Final    Tigecycline Sensitive <=2 mcg/mL MIKEY Final           [*]  Suppressed Antibiotic                 Blood Culture [[id number]] Collected: 07/18/22 2152    Order Status: Completed Specimen: Blood from Peripheral Updated: 07/19/22 0743     Significant Indicator NEG     Source BLD     Site PERIPHERAL     Culture Result No Growth  Note: Blood cultures are incubated for 5 days and  are monitored continuously.Positive blood cultures  are called to the RN and reported as soon as  they are identified.      Narrative:      1 of 2 for Blood Culture x 2 sites order. Per Hospital  Policy: Only change Specimen Src: to \"Line\" if specified by  physician order.  Left AC    URINALYSIS,CULTURE IF INDICATED [[id number]]  (Abnormal) Collected: 07/18/22 2335    Order Status: Completed Specimen: Urine Updated: 07/19/22 0003     Color Yellow     Character Cloudy     Specific Gravity >=1.045     Ph 6.0     Glucose Negative mg/dL      Ketones Trace mg/dL      Protein 30 mg/dL      Bilirubin Negative     Urobilinogen, Urine 1.0     Nitrite Positive     Leukocyte Esterase Moderate     Occult Blood Small " "    Micro Urine Req Microscopic    Narrative:      Indication for culture:->Patient WITHOUT an indwelling Wu  catheter in place with new onset of Dysuria, Frequency,  Urgency, and/or Suprapubic pain    CoV-2, FLU A/B, and RSV by PCR (2-4 Hours CEPHEID) : Collect NP swab in VTM [[id number]] Collected: 07/18/22 2109    Order Status: Completed Specimen: Respirate from Nasopharyngeal Updated: 07/18/22 2233     Influenza virus A RNA Negative     Influenza virus B, PCR Negative     RSV, PCR Negative     SARS-CoV-2 by PCR NotDetected     Comment: PATIENTS: Important information regarding your results and instructions can  be found at [email or web address]   \"After your  Covid-19 Test\"    [facility] providers: PLEASE REFER TO DE-ESCALATION AND RETESTING PROTOCOL  on [email or web address]    **The youwho GeneXpert Xpress SARS-CoV-2 RT-PCR Test has been made  available for use under the Emergency Use Authorization (EUA) only.          SARS-CoV-2 Source NP Swab    Blood Culture [[id number]] Collected: 07/18/22 0000    Order Status: Canceled Specimen: Other from Peripheral           Plan:   Appropriate antibiotic therapy prescribed. No changes required based upon culture result.  Sent letter to patient to notify of positive culture result and encourage compliance with prescribed antibiotics.     Helio Ritter PharmD, BCCCP, BCPS    "

## 2022-07-23 LAB
BACTERIA BLD CULT: NORMAL
SIGNIFICANT IND 70042: NORMAL
SITE SITE: NORMAL
SOURCE SOURCE: NORMAL

## 2022-07-27 NOTE — ED PROVIDER NOTES
ED Provider Note    Scribed for Aleena Tamayo M.D. by Katie Lim. 4/16/2022, 12:51 PM.    Primary care provider: Naldo Gambino M.D.  Means of arrival: Walk-in  History obtained from: Patient  History limited by: None noted    CHIEF COMPLAINT  Chief Complaint   Patient presents with   • Abdominal Pain     Starting this morning   • Vaginal Bleeding     X1 week. Patient had a positive home pregnancy test last month and last night retested and was positive.        HPI  Audrey Fagan is a 21 y.o. female who presents to the Emergency Department with abdominal cramping onset this morning. Patient states she had a positive pregnancy test at home at the end of February, but is unsure of the exact date. She endorses associated vaginal blood clots, fatigue, abdominal cramping, chills, and dizziness, but denies any fevers. She reports saturating 2 maxi pads every hour initially but it has improved. No alleviating factors attempted. Patient states that this is her second pregnancy and that her first pregnancy was aborted.     REVIEW OF SYSTEMS  Pertinent positives include abdominal cramping, vaginal blood clots, fatigue, abdominal cramping, chills, and dizziness. Pertinent negatives include no fevers. All other systems reviewed and negative.     PAST MEDICAL HISTORY   has a past medical history of Healthy pediatric patient.    SURGICAL HISTORY  patient denies any surgical history    SOCIAL HISTORY  Social History     Tobacco Use   • Smoking status: Never Smoker   • Smokeless tobacco: Never Used   Vaping Use   • Vaping Use: Never used   Substance Use Topics   • Alcohol use: Yes   • Drug use: No      Social History     Substance and Sexual Activity   Drug Use No       FAMILY HISTORY  Family History   Problem Relation Age of Onset   • No Known Problems Mother    • No Known Problems Father    • No Known Problems Sister    • No Known Problems Brother        CURRENT MEDICATIONS  Home Medications    **Home medications have not  Patient has a diabetes and A1c is getting better last time A1c was 7.1 on July 2022 we will continue current management continue diet and exercise walking and low-cholesterol low-fat diabetic diet   "yet been reviewed for this encounter**         ALLERGIES  No Known Allergies    PHYSICAL EXAM  VITAL SIGNS: /63   Pulse 90   Temp 36.7 °C (98 °F) (Temporal)   Resp 14   Ht 1.499 m (4' 11\")   Wt 51.9 kg (114 lb 6.7 oz)   LMP 02/21/2022 (Within Weeks)   SpO2 97%   BMI 23.11 kg/m²     Constitutional: Well developed, No acute distress, Non-toxic appearance.   HENT: Normocephalic, Atraumatic, Bilateral external ears normal, Nose normal.   Eyes: PERRL, EOMI, Conjunctiva normal.    Neck: Normal range of motion, No tenderness, Supple.    Cardiovascular: Normal heart rate, Normal rhythm.    Thorax & Lungs: Normal breath sounds, No respiratory distress.    Abdomen: Benign abdominal exam, no tenderness, no distention, no guarding, no rebound.    Pelvic: Os closed, dark blood in vault but no clots.  Skin: Warm, Dry, No erythema, No rash.   Back: No tenderness, No CVA tenderness.   Extremities: Intact distal pulses, No edema, No tenderness   Neurologic: Alert & oriented x 3, Normal motor function, Normal sensory function, No focal deficits noted.   Psychiatric: Appropriate                                                     DIAGNOSTIC STUDIES / PROCEDURES\    LABS  Results for orders placed or performed during the hospital encounter of 04/16/22   CBC WITH DIFFERENTIAL   Result Value Ref Range    WBC 9.9 4.8 - 10.8 K/uL    RBC 4.42 4.20 - 5.40 M/uL    Hemoglobin 13.2 12.0 - 16.0 g/dL    Hematocrit 37.4 37.0 - 47.0 %    MCV 84.6 81.4 - 97.8 fL    MCH 29.9 27.0 - 33.0 pg    MCHC 35.3 (H) 33.6 - 35.0 g/dL    RDW 39.5 35.9 - 50.0 fL    Platelet Count 237 164 - 446 K/uL    MPV 8.6 (L) 9.0 - 12.9 fL    Neutrophils-Polys 81.30 (H) 44.00 - 72.00 %    Lymphocytes 15.10 (L) 22.00 - 41.00 %    Monocytes 3.00 0.00 - 13.40 %    Eosinophils 0.00 0.00 - 6.90 %    Basophils 0.20 0.00 - 1.80 %    Immature Granulocytes 0.40 0.00 - 0.90 %    Nucleated RBC 0.00 /100 WBC    Neutrophils (Absolute) 8.05 (H) 2.00 - 7.15 K/uL    Lymphs " (Absolute) 1.49 1.00 - 4.80 K/uL    Monos (Absolute) 0.30 0.00 - 0.85 K/uL    Eos (Absolute) 0.00 0.00 - 0.51 K/uL    Baso (Absolute) 0.02 0.00 - 0.12 K/uL    Immature Granulocytes (abs) 0.04 0.00 - 0.11 K/uL    NRBC (Absolute) 0.00 K/uL   COMP METABOLIC PANEL   Result Value Ref Range    Sodium 137 135 - 145 mmol/L    Potassium 3.8 3.6 - 5.5 mmol/L    Chloride 104 96 - 112 mmol/L    Co2 23 20 - 33 mmol/L    Anion Gap 10.0 7.0 - 16.0    Glucose 139 (H) 65 - 99 mg/dL    Bun 7 (L) 8 - 22 mg/dL    Creatinine 0.41 (L) 0.50 - 1.40 mg/dL    Calcium 9.1 8.5 - 10.5 mg/dL    AST(SGOT) 20 12 - 45 U/L    ALT(SGPT) 11 2 - 50 U/L    Alkaline Phosphatase 54 30 - 99 U/L    Total Bilirubin 0.4 0.1 - 1.5 mg/dL    Albumin 4.5 3.2 - 4.9 g/dL    Total Protein 6.8 6.0 - 8.2 g/dL    Globulin 2.3 1.9 - 3.5 g/dL    A-G Ratio 2.0 g/dL   LIPASE   Result Value Ref Range    Lipase 22 11 - 82 U/L   HCG QUANTITATIVE   Result Value Ref Range    Bhcg 1947.0 (H) 0.0 - 5.0 mIU/mL   ESTIMATED GFR   Result Value Ref Range    GFR (CKD-EPI) 144 >60 mL/min/1.73 m 2     All labs reviewed by me.    RADIOLOGY  US-OB TRANSVAGINAL ONLY   Final Result      1.  No intrauterine pregnancy is identified. Heterogeneity and thickening of the endometrial stripe in the lower uterine segment may be related to failed first trimester pregnancy. Continued follow-up is recommended.        The radiologist's interpretation of all radiological studies have been reviewed by me.    COURSE & MEDICAL DECISION MAKING  Nursing notes, VS, PMSFHx reviewed in chart.     Patient presents with vaginal bleeding in pregnancy.  Patient has no peritoneal signs.  Stable vital signs.  Pelvic exam shows a closed os.  Mild bleeding noted without clots.    12:51 PM Patient seen and examined at bedside. The patient presents with abdominal cramping and the differential diagnosis includes but is not limited to Miscarrige ectopic, blleding in preg. Ordered for US-OB Transvaginal, RH type for Rhogam,  CBC with differential, CMP, Lipase, HCG Quantitative, and Urinalysis Culture to evaluate.     12:08 PM - Pelvic examination  performed by myself as detailed above.     Ultrasound has returned and shows no IUP.  Patient's labs show no anemia.  Normal white count.  No significant electrolyte abnormalities.  Awaiting Rh.    Plan when Rh returns is for patient to be discharged.  I suspect the patient had a miscarriage.  However will need to return in 48 hours for repeat Rh to ensure patient does not have an ectopic pregnancy.  Return precautions were given.  Patient understands a follow-up plan.    I discussed the case with Dr. Moyer of gynecology.  She reviewed the patient's ultrasound.  She thinks she sees evidence of an IUP.  She will see the patient in follow-up on Monday.  I will write for a beta-hCG prior to her follow-up appointment.  Vaginal bleeding precautions were given.      FINAL IMPRESSION  1. Threatened miscarriage    2. Vaginal bleeding          Katie GR (Scribe), am scribing for, and in the presence of, Aleena Tamayo M.D..    Electronically signed by: Katie Lim (Jah), 4/16/2022    IAleena M.D. personally performed the services described in this documentation, as scribed by Katie Lim in my presence, and it is both accurate and complete. C.    The note accurately reflects work and decisions made by me.  Aleena Tamayo M.D.  4/16/2022  3:45 PM

## 2023-04-13 NOTE — ED NOTES
Patient returned from CT with CT tech staff.  Patient arrives in department, alert and NAD.  Patient placed back on full monitor on cardiac leads.   Post Op Note     Called to check on patient postoperatively after hospital discharge.     Patient is s/p TAC with ileorectal anastomosis and ALEXANDER-BSO with Dr. Jerome Ashley  for colon cancer.   Admitted 4/6 and discharged on 4/11.      Pain is well controlled with tylenol, oxycodone, and gabapentin      Lovenox: her niece is injecting this daily     Patient is eating and drinking normally. Patient is on a low fiber diet.  Encouraged patient to drink 8-10 glasses of water a day.     Patient is passing flats, is having soft bowel movements.    Patient is voiding normally and urine is light in color.    Patient is not set up with home care.     Patient Denies nausea and vomiting.    Patient Denies any fevers or chills.    Patient's incision is C/D/I. Patient reminded NOT to remove any dressings over their incisions.     Patient is on a activity restriction. Lifting 10 pounds for 6 weeks.     Patient Denies needing any forms completed.     Follow up is set up with Angelina Williamson NP on 5/1 and with Dr. Jerome Ashley  on 5/19.   Encouraged the patient to contact the clinic in the meantime with any fevers, chills, nausea, vomiting, increased colostomy output, no colostomy output, dizziness, lightheadedness, uncontrolled pain, changes to the incisions, or with any questions or concerns.    Patient's questions were answered to their stated satisfaction and they are in agreement with this plan.    MARQUEZ Tristan [phone]  [facility]  [facility]

## 2023-07-31 ENCOUNTER — OFFICE VISIT (OUTPATIENT)
Dept: URGENT CARE | Facility: CLINIC | Age: 23
End: 2023-07-31
Payer: COMMERCIAL

## 2023-07-31 VITALS
HEIGHT: 59 IN | DIASTOLIC BLOOD PRESSURE: 78 MMHG | OXYGEN SATURATION: 98 % | RESPIRATION RATE: 12 BRPM | HEART RATE: 123 BPM | TEMPERATURE: 99.8 F | BODY MASS INDEX: 22.58 KG/M2 | SYSTOLIC BLOOD PRESSURE: 118 MMHG | WEIGHT: 112 LBS

## 2023-07-31 DIAGNOSIS — R10.31 PAIN, ABDOMINAL, RLQ: ICD-10-CM

## 2023-07-31 DIAGNOSIS — Z20.2 POSSIBLE EXPOSURE TO STD: ICD-10-CM

## 2023-07-31 LAB
APPEARANCE UR: CLEAR
BILIRUB UR STRIP-MCNC: NEGATIVE MG/DL
COLOR UR AUTO: YELLOW
GLUCOSE UR STRIP.AUTO-MCNC: NEGATIVE MG/DL
KETONES UR STRIP.AUTO-MCNC: NEGATIVE MG/DL
LEUKOCYTE ESTERASE UR QL STRIP.AUTO: NORMAL
NITRITE UR QL STRIP.AUTO: NEGATIVE
PH UR STRIP.AUTO: 6.5 [PH] (ref 5–8)
POCT INT CON NEG: NEGATIVE
POCT INT CON POS: POSITIVE
POCT URINE PREGNANCY TEST: NEGATIVE
PROT UR QL STRIP: NEGATIVE MG/DL
RBC UR QL AUTO: NEGATIVE
SP GR UR STRIP.AUTO: <=1.002
UROBILINOGEN UR STRIP-MCNC: 0.2 MG/DL

## 2023-07-31 PROCEDURE — 3074F SYST BP LT 130 MM HG: CPT | Performed by: NURSE PRACTITIONER

## 2023-07-31 PROCEDURE — 81002 URINALYSIS NONAUTO W/O SCOPE: CPT | Performed by: NURSE PRACTITIONER

## 2023-07-31 PROCEDURE — 81025 URINE PREGNANCY TEST: CPT | Performed by: NURSE PRACTITIONER

## 2023-07-31 PROCEDURE — 3078F DIAST BP <80 MM HG: CPT | Performed by: NURSE PRACTITIONER

## 2023-07-31 PROCEDURE — 99215 OFFICE O/P EST HI 40 MIN: CPT | Performed by: NURSE PRACTITIONER

## 2023-07-31 ASSESSMENT — ENCOUNTER SYMPTOMS
FEVER: 0
BACK PAIN: 1
ABDOMINAL PAIN: 1
NAUSEA: 1
CHILLS: 1
FLANK PAIN: 0
COUGH: 0

## 2023-07-31 ASSESSMENT — FIBROSIS 4 INDEX: FIB4 SCORE: 0.67

## 2023-08-01 NOTE — PROGRESS NOTES
"Subjective:   Audrey Fagan is a 22 y.o. female who presents for Fever (X Tuesday, vaginal itch, odor, ovary pain L)      HPI  Patient is a 22-year-old female presents urgent care for evaluation of right lower pelvic pain that been ongoing for the past week.  Patient having nausea, fever.  Concerned she may have an infection as she has had a kidney infection.  She is also concerned that she may have had an exposure to an STI as she is with a new sexual partner.  Does have some vaginal discharge describes as dark brown.  She is not on any form of birth control.  Denies any past medical history.    Review of Systems   Constitutional:  Positive for chills. Negative for fever.   HENT:  Negative for congestion.    Respiratory:  Negative for cough.    Cardiovascular:  Negative for chest pain.   Gastrointestinal:  Positive for abdominal pain and nausea.   Genitourinary:  Negative for dysuria, flank pain, frequency, hematuria and urgency.   Musculoskeletal:  Positive for back pain.   Skin:  Negative for rash.       Medications:    This patient does not have an active medication from one of the medication groupers.    Allergies: Patient has no known allergies.    Problem List: Audrey Fagan does not have any pertinent problems on file.    Surgical History:  No past surgical history on file.    Past Social Hx: Audrey Fagan  reports that she has never smoked. She has never used smokeless tobacco. She reports current alcohol use. She reports current drug use. Drugs: Inhaled and Marijuana.     Past Family Hx:  Audrey Fagan family history includes No Known Problems in her brother, father, mother, and sister.     Problem list, medications, and allergies reviewed by myself today in Epic.     Objective:     /78   Pulse (!) 123   Temp 37.7 °C (99.8 °F) (Temporal)   Resp 12   Ht 1.499 m (4' 11\")   Wt 50.8 kg (112 lb)   LMP 07/17/2023 (Approximate)   " SpO2 98%   BMI 22.62 kg/m²     Physical Exam  Vitals and nursing note reviewed.   Constitutional:       General: She is not in acute distress.     Appearance: She is well-developed.   HENT:      Head: Normocephalic and atraumatic.      Right Ear: External ear normal.      Left Ear: External ear normal.      Nose: Nose normal.      Mouth/Throat:      Mouth: Mucous membranes are moist.   Eyes:      Conjunctiva/sclera: Conjunctivae normal.   Cardiovascular:      Rate and Rhythm: Normal rate.   Pulmonary:      Effort: Pulmonary effort is normal. No respiratory distress.      Breath sounds: Normal breath sounds.   Abdominal:      General: There is no distension.      Tenderness: There is abdominal tenderness in the right lower quadrant. There is guarding. There is no right CVA tenderness, left CVA tenderness or rebound. Positive signs include McBurney's sign. Negative signs include Schulzt's sign, Rovsing's sign, psoas sign and obturator sign.          Comments: Pain elicited with movement   Genitourinary:     Comments: deferred  Musculoskeletal:         General: Normal range of motion.   Skin:     General: Skin is warm and dry.   Neurological:      General: No focal deficit present.      Mental Status: She is alert and oriented to person, place, and time. Mental status is at baseline.      Gait: Gait (gait at baseline) normal.   Psychiatric:         Judgment: Judgment normal.         Assessment/Plan:     Diagnosis and associated orders:     1. Pain, abdominal, RLQ  POCT Urinalysis    POCT Pregnancy      2. Possible exposure to STD           Comments/MDM:     At this time, I feel the patient requires a higher level of care including closer monitoring, stat lab work and/or imaging for further evaluation for complaints of right lower quadrant pain urinalysis positive for leukocytes no CVA tenderness this has been discussed with the patient and they state agreement and understanding.  I offered the patient an ambulance ride  and  the patient is declining at this time. The patient is in no acute distress upon clinic departure and will go directly to ED without delay.                  Please note that this dictation was created using voice recognition software. I have made a reasonable attempt to correct obvious errors, but I expect that there are errors of grammar and possibly content that I did not discover before finalizing the note.    This note was electronically signed by Daquan HATCH.

## 2024-03-10 ENCOUNTER — HOSPITAL ENCOUNTER (EMERGENCY)
Facility: MEDICAL CENTER | Age: 24
End: 2024-03-10
Attending: EMERGENCY MEDICINE

## 2024-03-10 VITALS
HEIGHT: 59 IN | TEMPERATURE: 98 F | HEART RATE: 87 BPM | SYSTOLIC BLOOD PRESSURE: 97 MMHG | WEIGHT: 110 LBS | RESPIRATION RATE: 15 BRPM | DIASTOLIC BLOOD PRESSURE: 62 MMHG | BODY MASS INDEX: 22.18 KG/M2 | OXYGEN SATURATION: 95 %

## 2024-03-10 DIAGNOSIS — F10.920 ALCOHOLIC INTOXICATION WITHOUT COMPLICATION (HCC): ICD-10-CM

## 2024-03-10 LAB — GLUCOSE BLD STRIP.AUTO-MCNC: 92 MG/DL (ref 65–99)

## 2024-03-10 PROCEDURE — 99285 EMERGENCY DEPT VISIT HI MDM: CPT

## 2024-03-10 PROCEDURE — 82962 GLUCOSE BLOOD TEST: CPT

## 2024-03-10 PROCEDURE — 700105 HCHG RX REV CODE 258: Mod: UD | Performed by: EMERGENCY MEDICINE

## 2024-03-10 PROCEDURE — 700111 HCHG RX REV CODE 636 W/ 250 OVERRIDE (IP): Mod: JZ,UD | Performed by: EMERGENCY MEDICINE

## 2024-03-10 PROCEDURE — 96374 THER/PROPH/DIAG INJ IV PUSH: CPT

## 2024-03-10 RX ORDER — ONDANSETRON 2 MG/ML
4 INJECTION INTRAMUSCULAR; INTRAVENOUS ONCE
Status: COMPLETED | OUTPATIENT
Start: 2024-03-10 | End: 2024-03-10

## 2024-03-10 RX ORDER — SODIUM CHLORIDE 9 MG/ML
1000 INJECTION, SOLUTION INTRAVENOUS ONCE
Status: COMPLETED | OUTPATIENT
Start: 2024-03-10 | End: 2024-03-10

## 2024-03-10 RX ADMIN — SODIUM CHLORIDE 1000 ML: 9 INJECTION, SOLUTION INTRAVENOUS at 05:36

## 2024-03-10 RX ADMIN — ONDANSETRON 4 MG: 2 INJECTION INTRAMUSCULAR; INTRAVENOUS at 05:35

## 2024-03-10 ASSESSMENT — FIBROSIS 4 INDEX: FIB4 SCORE: 0.7

## 2024-03-10 ASSESSMENT — PAIN DESCRIPTION - PAIN TYPE: TYPE: ACUTE PAIN

## 2024-03-10 NOTE — ED TRIAGE NOTES
"  Chief Complaint   Patient presents with    Alcohol Intoxication     Pt presents to triage for alcohol intoxication. Per EMS pt was found unconscious in the [facility] restroom and the GSR. No signs of trauma. Pt has no medical complaints at this time, denies drugs. Pt uncooperative with further questioning.     Pt is alert/oriented, following commands, and answering questions appropriately. Pt placed in lobby and educated on triage process. Pt encouraged to alert staff for any changes in condition.    ./57   Pulse 94   Resp 15   Ht 1.499 m (4' 11\")   Wt 49.9 kg (110 lb)   SpO2 97%   BMI 22.22 kg/m²     "

## 2024-03-10 NOTE — ED PROVIDER NOTES
ED Provider Note    Scribed for Yelena Schultz M.D. by Silverio Rivas. 3/10/2024, 4:45 AM.    Primary care provider: Naldo Gambino M.D. (Inactive)  Means of arrival: EMS  History obtained from: patient  History limited by: none    CHIEF COMPLAINT  Chief Complaint   Patient presents with    Alcohol Intoxication       HPI/ROS  Audrey Alysa Fagan is a 23 y.o. female who presents to the Emergency Department for alcohol intoxication onset earlier tonight. Per EMS she was found in the mens bathroom of the R. She nods when asked if she had too much to drink tonight. Admits to alcohol intoxication, denies other drug use.  Declines calling friends or family at this time. Blood sugar per EMS was 116.     EXTERNAL RECORDS REVIEWED  Seen at urgent care in July 2023 for abdominal pain, told to go to ED     LIMITATION TO HISTORY   Select: Intoxication, but does admit to drinking    OUTSIDE HISTORIAN(S):  None    PAST MEDICAL HISTORY   has a past medical history of Healthy pediatric patient.    SURGICAL HISTORY  patient denies any surgical history    SOCIAL HISTORY  Social History     Tobacco Use    Smoking status: Never    Smokeless tobacco: Never   Vaping Use    Vaping Use: Never used   Substance Use Topics    Alcohol use: Yes    Drug use: Yes     Types: Inhaled, Marijuana      Social History     Substance and Sexual Activity   Drug Use Yes    Types: Inhaled, Marijuana       FAMILY HISTORY  Family History   Problem Relation Age of Onset    No Known Problems Mother     No Known Problems Father     No Known Problems Sister     No Known Problems Brother        CURRENT MEDICATIONS  Home Medications       Reviewed by Renate Sanders R.N. (Registered Nurse) on 03/10/24 at 0434  Med List Status: Not Addressed     Medication Last Dose Status        Patient Charles Taking any Medications                           ALLERGIES  No Known Allergies    PHYSICAL EXAM  VITAL SIGNS: /57   Pulse 94   Resp 15   Ht 1.499 m (4'  "11\")   Wt 49.9 kg (110 lb)   SpO2 97%   BMI 22.22 kg/m²   Vitals reviewed by myself.  Nursing note and vitals reviewed.  Constitutional: Well-developed and well-nourished. covered in emesis  HENT: Head is normocephalic and atraumatic.  Eyes: extra-ocular movements intact  Cardiovascular: Regular rate and regular rhythm. No murmur heard.  Pulmonary/Chest: Breath sounds normal. No wheezes or rales.   Abdominal: Soft and non-tender. No distention.    Musculoskeletal: Extremities exhibit normal range of motion without edema or tenderness.   Neurological: Awake and alert, slight intoxication  Skin: Skin is warm and dry. No rash.      DIAGNOSTIC STUDIES:  LABS  Labs Reviewed   POCT GLUCOSE DEVICE RESULTS       All labs reviewed and independently interpreted by myself    COURSE & MEDICAL DECISION MAKING    ED Observation Status? No, this patient does not meet criteria for ED Observation.     INITIAL ASSESSMENT, ED COURSE AND PLAN    Patient is a 23-year-old female who comes in for evaluation of alcohol intoxication.  Per EMS blood sugar was 116.  On arrival she has some emesis and blood pressure is borderline hypotensive.  Differential diagnosis includes alcohol intoxication, dehydration, electrolyte derangement.  At this time clinically patient is nontoxic-appearing, likely dehydrated from vomiting, no indication for labs.  She has no focal neurologic deficits and has no signs of trauma no indication for head CT at this time.    Patient's initial vitals notable for borderline hypotension, she is treated with IV fluids and Zofran.  After nurse instilled Zofran and started IV fluids patient refused IV and IV was removed.  She did not receive IV fluids.  Upon multiple reassessments patient is becoming more more clinically sober however has tried to leave the emergency department and was unstable on her feet, speech is still slurred.  I advised her she is not safe for discharge to self.  I advised her if she can find a " "safe ride she would be safe for discharge.  Patient is amenable to this plan.  Initially declined calling family and friends but then was amenable to this.  I was able to speak with her emergency contact Jose who came to bedside to  the patient.  At this time patient is ambulating with a steady gait and is more coherent.  She is no longer having any nausea or vomiting.  Therefore she is safe for discharge.  Patient is discharged to the care of Jose in stable condition.       REASSESSMENTS   4:53 AM - Patient seen and examined at bedside. Informed patient of plan of care which includes waiting for sobriety. She is amenable to this plan of care. I offered to call some of her friends or family but she declines at this time.    5:21 AM - Patient was reevaluated at bedside. She is borderline hypotensive, had episode of vomiting. IV fluids and Zofran started but shortly after administering she would like IV removed.     6:02 AM - Patient attempting to leave, not clinically sober yet. She is not answering questions at this time, uncooperative. Slurring words and unsteady gait. She states that she has a safe ride home but does not have a phone. Informed her that she cannot leave without a safe ride, so she asks that we \"call her an Uber\".  Patient is amenable at this time to having us call family, unable to provide number, I was able to find an emergency contact and contact them.    7:27 AM - I spoke with Jose her emergency contact at bedside who has arrived to ED and is amenable to take the patient home. Patient will now be discharged at this time. Discussed return precautions and plan for at home care. Patient verbalizes understanding and agreement to this plan of care.           DISPOSITION AND DISCUSSIONS  I have discussed management of the patient with the following physicians and WALTER's:  None    Discussion of management with other QHP or appropriate source(s): None     Escalation of care considered, " and ultimately not performed:blood analysis    Barriers to care at this time, including but not limited to: None    Decision tools and prescription drugs considered including, but not limited to: see above.    FINAL IMPRESSION  1. Alcoholic intoxication without complication (HCC)          Silverio GR (Scribe), am scribing for, and in the presence of, Yelena Schultz M.D..    Electronically signed by: Silverio Rivas (Valeriaibeduar), 3/10/2024    IYelena M.D. personally performed the services described in this documentation, as scribed by Silverio Rivas in my presence, and it is both accurate and complete.    The note accurately reflects work and decisions made by me.  Yelena Schultz M.D.  3/10/2024  7:35 AM

## 2024-03-10 NOTE — ED NOTES
Bedside report received from off going RN April, assumed care of patient.  POC discussed with patient. Call light within reach, all needs addressed at this time.       Fall risk interventions in place: Move the patient closer to the nurse's station, Patient's personal possessions are with in their safe reach, Place fall risk sign on patient's door, Keep floor surfaces clean and dry, and Accompanied to restroom (all applicable per [place] Fall risk assessment)   Continuous monitoring: Pulse Ox or Blood Pressure  IVF/IV medications: Not Applicable   Oxygen: Room Air  Bedside sitter: Not Applicable   Isolation: Not Applicable

## 2024-03-10 NOTE — ED NOTES
Discharge instructions given to pt including follow up with pcp or returning if no improvement of symptoms or to return if worse. Questions answered by RN. Denies any new complaints. Discharged w/stable vitals and able to ambulate to the lobby with steady gait accompanied by cousin juan. Aaox4. Gcs of 15

## 2024-03-10 NOTE — ED NOTES
Spoke with Jose, pt's cousin. States he is able to  the patient. Pt provided cordless phone for pt to speak with cousin.

## 2025-02-28 ENCOUNTER — INITIAL PRENATAL (OUTPATIENT)
Dept: OBGYN | Facility: CLINIC | Age: 25
End: 2025-02-28

## 2025-02-28 ENCOUNTER — APPOINTMENT (OUTPATIENT)
Dept: OBGYN | Facility: CLINIC | Age: 25
End: 2025-02-28

## 2025-02-28 VITALS — SYSTOLIC BLOOD PRESSURE: 90 MMHG | WEIGHT: 113.2 LBS | BODY MASS INDEX: 22.86 KG/M2 | DIASTOLIC BLOOD PRESSURE: 60 MMHG

## 2025-02-28 DIAGNOSIS — Z3A.12 12 WEEKS GESTATION OF PREGNANCY: ICD-10-CM

## 2025-02-28 PROBLEM — Z34.90 PREGNANT: Status: ACTIVE | Noted: 2025-02-28

## 2025-02-28 NOTE — PROGRESS NOTES
Audrey Fagan is a 24 y.o. female who presents for No chief complaint on file.          HPI Comments: Patient presents for termination of pregnancy.  Pt has a history of an ectopic pregnancy and desires to know if her current pregnancy is ectopic and desires to terminate.  Pt reports she is around 12 weeks per her LMP. Pt denies unilateal pelvic pain or vaginal bleeding. Pt reports presumptive signs of pregnancy such as nausea, vomiting, and fatigue.  Pt very sure about her decision to terminate.     Review of Systems   Pertinent positives documented in HPI and all other systems reviewed and are negative.      Past Medical History:   Diagnosis Date    Healthy pediatric patient        Medications:   No current outpatient medications on file.       Social History     Socioeconomic History    Marital status: Single   Tobacco Use    Smoking status: Never    Smokeless tobacco: Never   Vaping Use    Vaping status: Never Used   Substance and Sexual Activity    Alcohol use: Yes    Drug use: Yes     Types: Inhaled, Marijuana    Sexual activity: Never   Other Topics Concern    Speech difficulties No       Family History   Problem Relation Age of Onset    No Known Problems Mother     No Known Problems Father     No Known Problems Sister     No Known Problems Brother           Objective:   Vital measurements:  BP 90/60   Wt 113 lb 3.2 oz   BMI 22.86 kg/m²        Problem List Items Addressed This Visit       12 weeks gestation of pregnancy    Information given to the [facility]  Suspicion for ectopic pregnancy very low, discussed with patient.   SAB precautions given

## 2025-02-28 NOTE — ASSESSMENT & PLAN NOTE
Information given to the [facility]  Suspicion for ectopic pregnancy very low, discussed with patient.   SAB precautions given